# Patient Record
Sex: MALE | Race: WHITE | NOT HISPANIC OR LATINO | Employment: UNEMPLOYED | URBAN - METROPOLITAN AREA
[De-identification: names, ages, dates, MRNs, and addresses within clinical notes are randomized per-mention and may not be internally consistent; named-entity substitution may affect disease eponyms.]

---

## 2023-03-26 ENCOUNTER — TELEPHONE (OUTPATIENT)
Dept: PRIMARY CARE | Facility: CLINIC | Age: 48
End: 2023-03-26

## 2023-03-26 NOTE — TELEPHONE ENCOUNTER
Pls call [place] [facility] [employer] determination at [phone].  I received a request for records but I don't have the signed release.  If they could fax that to me: adiel melchor at [phone]. Pls verify that they have the correct fax number